# Patient Record
Sex: FEMALE | Employment: FULL TIME | URBAN - METROPOLITAN AREA
[De-identification: names, ages, dates, MRNs, and addresses within clinical notes are randomized per-mention and may not be internally consistent; named-entity substitution may affect disease eponyms.]

---

## 2024-03-08 ENCOUNTER — APPOINTMENT (OUTPATIENT)
Dept: PRIMARY CARE | Facility: CLINIC | Age: 29
End: 2024-03-08
Payer: COMMERCIAL

## 2024-03-15 ENCOUNTER — HOSPITAL ENCOUNTER (OUTPATIENT)
Dept: RADIOLOGY | Facility: CLINIC | Age: 29
Discharge: HOME | End: 2024-03-15
Payer: COMMERCIAL

## 2024-03-15 ENCOUNTER — OFFICE VISIT (OUTPATIENT)
Dept: PRIMARY CARE | Facility: CLINIC | Age: 29
End: 2024-03-15
Payer: COMMERCIAL

## 2024-03-15 VITALS
BODY MASS INDEX: 23.66 KG/M2 | SYSTOLIC BLOOD PRESSURE: 128 MMHG | DIASTOLIC BLOOD PRESSURE: 68 MMHG | HEART RATE: 64 BPM | TEMPERATURE: 96.9 F | RESPIRATION RATE: 14 BRPM | WEIGHT: 142 LBS | OXYGEN SATURATION: 99 % | HEIGHT: 65 IN

## 2024-03-15 DIAGNOSIS — M79.674 PAIN IN RIGHT TOE(S): ICD-10-CM

## 2024-03-15 DIAGNOSIS — Z00.00 HEALTHCARE MAINTENANCE: ICD-10-CM

## 2024-03-15 DIAGNOSIS — F41.1 ANXIETY NEUROSIS: ICD-10-CM

## 2024-03-15 DIAGNOSIS — M79.674 PAIN IN RIGHT TOE(S): Primary | ICD-10-CM

## 2024-03-15 PROCEDURE — 73630 X-RAY EXAM OF FOOT: CPT | Mod: RT

## 2024-03-15 PROCEDURE — 73630 X-RAY EXAM OF FOOT: CPT | Mod: RIGHT SIDE | Performed by: RADIOLOGY

## 2024-03-15 PROCEDURE — 73660 X-RAY EXAM OF TOE(S): CPT | Mod: RT

## 2024-03-15 PROCEDURE — 73660 X-RAY EXAM OF TOE(S): CPT | Mod: RIGHT SIDE | Performed by: RADIOLOGY

## 2024-03-15 PROCEDURE — 99385 PREV VISIT NEW AGE 18-39: CPT | Performed by: STUDENT IN AN ORGANIZED HEALTH CARE EDUCATION/TRAINING PROGRAM

## 2024-03-15 PROCEDURE — 1036F TOBACCO NON-USER: CPT | Performed by: STUDENT IN AN ORGANIZED HEALTH CARE EDUCATION/TRAINING PROGRAM

## 2024-03-15 RX ORDER — DROSPIRENONE AND ETHINYL ESTRADIOL 0.02-3(28)
1 KIT ORAL
COMMUNITY
Start: 2023-04-20 | End: 2024-03-21

## 2024-03-15 RX ORDER — SERTRALINE HYDROCHLORIDE 100 MG/1
100 TABLET, FILM COATED ORAL DAILY
COMMUNITY

## 2024-03-15 ASSESSMENT — PATIENT HEALTH QUESTIONNAIRE - PHQ9
2. FEELING DOWN, DEPRESSED OR HOPELESS: NOT AT ALL
1. LITTLE INTEREST OR PLEASURE IN DOING THINGS: NOT AT ALL
SUM OF ALL RESPONSES TO PHQ9 QUESTIONS 1 & 2: 0

## 2024-03-15 ASSESSMENT — ENCOUNTER SYMPTOMS
WEAKNESS: 0
DIZZINESS: 0
FEVER: 0
VOMITING: 0
NAUSEA: 0
SHORTNESS OF BREATH: 0
CHILLS: 0
UNEXPECTED WEIGHT CHANGE: 0
LIGHT-HEADEDNESS: 0
DIARRHEA: 0
DYSURIA: 0
DIAPHORESIS: 0
MYALGIAS: 0

## 2024-03-15 NOTE — PROGRESS NOTES
"Subjective   Patient ID: Rachel Ford is a 29 y.o. female who presents for Establish Care, Toe Pain (Pt hit her right great toe around April of last year. She states that about 2-3 weeks ago she started having pain again. ), and Annual Exam.  HPI    She is here to establish care.    She thinks she injured her right great toe about 1 year ago, stubbed on her bed post. Some decreased ROM in right great toe but no pain. Pain was present at end of the ROM of the great toe for the last year. However in last 2-3 weeks seems to have gotten more painful. No reinjury to the toe that she knows of. She does powerlifting.  Calf raises worsen the toe. No pain at rest. No numbness/tingling.     Was in [place] for 5 years and had PCP there.     PMH: Denies.       Review of Systems   Constitutional:  Negative for chills, diaphoresis, fever and unexpected weight change.   HENT:  Negative for hearing loss.    Eyes:  Negative for visual disturbance.   Respiratory:  Negative for shortness of breath.    Cardiovascular:  Negative for chest pain.   Gastrointestinal:  Negative for diarrhea, nausea and vomiting.   Endocrine: Negative for cold intolerance and heat intolerance.   Genitourinary:  Negative for dysuria.   Musculoskeletal:  Negative for myalgias.   Skin:  Negative for rash.   Neurological:  Negative for dizziness, syncope, weakness and light-headedness.       /68   Pulse 64   Temp 36.1 °C (96.9 °F) (Temporal)   Resp 14   Ht 1.651 m (5' 5\")   Wt 64.4 kg (142 lb)   SpO2 99%   BMI 23.63 kg/m²     Objective   Physical Exam  Vitals reviewed.   HENT:      Head: Normocephalic.      Right Ear: Tympanic membrane, ear canal and external ear normal. There is no impacted cerumen.      Left Ear: Tympanic membrane, ear canal and external ear normal. There is no impacted cerumen.      Mouth/Throat:      Mouth: Mucous membranes are moist.      Pharynx: Oropharynx is clear. No oropharyngeal exudate or posterior oropharyngeal " erythema.   Cardiovascular:      Rate and Rhythm: Normal rate and regular rhythm.      Pulses: Normal pulses.   Pulmonary:      Effort: Pulmonary effort is normal. No respiratory distress.      Breath sounds: Normal breath sounds.   Abdominal:      General: Bowel sounds are normal. There is no distension.      Palpations: Abdomen is soft. There is no mass.      Tenderness: There is no abdominal tenderness. There is no guarding or rebound.   Musculoskeletal:         General: No deformity.      Cervical back: Neck supple. No tenderness.      Comments: Range of motion intact in bilateral toes and feet.  No significant pain with palpation of the toes.  No swelling or significant warmth or erythema present in bilateral toes or feet.   Lymphadenopathy:      Cervical: No cervical adenopathy.   Skin:     Capillary Refill: Capillary refill takes less than 2 seconds.      Coloration: Skin is not jaundiced.   Neurological:      Mental Status: She is alert.   Psychiatric:         Mood and Affect: Mood normal.         Behavior: Behavior normal.         Assessment/Plan   Problem List Items Addressed This Visit       Pain in right toe(s) - Primary    Relevant Orders    XR toe right 2+ views (Completed)    XR foot right 3+ views (Completed)    Referral to Podiatry    Anxiety neurosis    Healthcare maintenance     Anxiety Neurosis  -On zoloft 100mg/day, managed by gynecology    Right toe pain  - X-rays ordered and referred to podiatry.  Let us know if symptoms change or worsen.  -Discussed wearing good fitting shoes can also be helpful as given the location of pain could be related to this.    ?abnormal TSH  -Noted in [facility] note 4/28 but I can't see any record of TSH drawn. Will reach out to pt for clarification.     Hx ascus with hpv  -F/u gynecology at [facility]    On OCPs per gynecology.    Health Maintenance  -Pap smear:Sees gynecology  -Vaccinations: Advised flu vaccine, TDAP (thinks had 2017), covid vaccination.   -Mammogram: Age  40  -Colonoscopy:Age 45  -Lung Cancer Screening: Not indicated  -Screen for HIV/Hep C: Negative 1/3/23  -DEXA: Age 65    CPE completed.  Advised to keep a heart healthy, low fat  diet with fruits and veggies like Mediterranean diet.  Advised on the importance of exercise and maintaining 150 minutes of exercise per week (30 minutes per day 5 days a week).  Advised on regular eye and dental visits.  Discussed age appropriate cancer screening, immunizations and recommendations given.  Discussed avoiding illicit drugs and tobacco. Advised on appropriate use of alcohol.  Advised to wear seat belt.      She is an exercise physiologist. At [employer] job and hoping to return to exercise physiology. At home with roommate. Has dog at home.     Labs from April 2023 reviewed, she will get updated with gynecology and let us know if they do not order.    CPE 1 year  Follow-up sooner sooner if needed

## 2024-03-16 PROBLEM — F41.1 ANXIETY NEUROSIS: Status: ACTIVE | Noted: 2024-03-16

## 2024-03-16 PROBLEM — Z00.00 HEALTHCARE MAINTENANCE: Status: ACTIVE | Noted: 2024-03-16

## 2024-03-16 PROBLEM — M79.674 PAIN IN RIGHT TOE(S): Status: ACTIVE | Noted: 2024-03-16

## 2025-03-21 ENCOUNTER — APPOINTMENT (OUTPATIENT)
Dept: PRIMARY CARE | Facility: CLINIC | Age: 30
End: 2025-03-21
Payer: COMMERCIAL